# Patient Record
Sex: FEMALE | Race: WHITE | NOT HISPANIC OR LATINO | URBAN - METROPOLITAN AREA
[De-identification: names, ages, dates, MRNs, and addresses within clinical notes are randomized per-mention and may not be internally consistent; named-entity substitution may affect disease eponyms.]

---

## 2023-06-20 LAB
AMPHETAMINE (PRESENCE) IN URINE BY SCREEN METHOD: ABNORMAL
BARBITURATES PRESENCE IN URINE BY SCREEN METHOD: ABNORMAL
BENZODIAZEPINE (PRESENCE) IN URINE BY SCREEN METHOD: ABNORMAL
CANNABINOIDS IN URINE BY SCREEN METHOD: ABNORMAL
COCAINE (PRESENCE) IN URINE BY SCREEN METHOD: ABNORMAL
DRUG SCREEN COMMENT URINE: ABNORMAL
FENTANYL URINE: ABNORMAL
METHADONE (PRESENCE) IN URINE BY SCREEN METHOD: ABNORMAL
OPIATES (PRESENCE) IN URINE BY SCREEN METHOD: ABNORMAL
OXYCODONE (PRESENCE) IN URINE BY SCREEN METHOD: ABNORMAL
PHENCYCLIDINE (PRESENCE) IN URINE BY SCREEN METHOD: ABNORMAL

## 2023-06-26 LAB
AMPHETAMINES,URINE: >5000 NG/ML
MDA,URINE: <200 NG/ML
MDEA,URINE: <200 NG/ML
MDMA,UR: <200 NG/ML
METHAMPHETAMINE QUANTITATIVE URINE: <200 NG/ML
PHENTERMINE,UR: <200 NG/ML

## 2023-11-09 ENCOUNTER — TELEPHONE (OUTPATIENT)
Dept: NEUROLOGY | Facility: CLINIC | Age: 43
End: 2023-11-09
Payer: COMMERCIAL

## 2023-11-09 DIAGNOSIS — F98.8 ADD (ATTENTION DEFICIT DISORDER) WITHOUT HYPERACTIVITY: Primary | ICD-10-CM

## 2023-11-09 PROBLEM — F43.10 PTSD (POST-TRAUMATIC STRESS DISORDER): Status: ACTIVE | Noted: 2023-11-09

## 2023-11-09 PROBLEM — O99.330: Status: ACTIVE | Noted: 2023-11-09

## 2023-11-09 PROBLEM — F90.2 ATTENTION DEFICIT HYPERACTIVITY DISORDER (ADHD), COMBINED TYPE: Status: ACTIVE | Noted: 2023-11-09

## 2023-11-09 PROBLEM — F17.209 NICOTINE DEPENDENCE WITH NICOTINE-INDUCED DISORDER: Status: ACTIVE | Noted: 2023-11-09

## 2023-11-09 PROBLEM — F32.A DEPRESSION: Status: ACTIVE | Noted: 2023-11-09

## 2023-11-09 PROBLEM — G93.40 ENCEPHALOPATHY: Status: ACTIVE | Noted: 2023-11-09

## 2023-11-09 RX ORDER — BUPROPION HYDROCHLORIDE 150 MG/1
TABLET, EXTENDED RELEASE ORAL
COMMUNITY
Start: 2022-10-04

## 2023-11-09 RX ORDER — METHYLPREDNISOLONE 4 MG/1
TABLET ORAL
COMMUNITY
Start: 2023-05-09

## 2023-11-09 RX ORDER — DOXYCYCLINE 100 MG/1
100 CAPSULE ORAL 2 TIMES DAILY
COMMUNITY
Start: 2023-05-09 | End: 2023-05-16

## 2023-11-09 RX ORDER — ALBUTEROL SULFATE 90 UG/1
2 AEROSOL, METERED RESPIRATORY (INHALATION) EVERY 4 HOURS PRN
COMMUNITY
Start: 2023-05-09

## 2023-11-09 RX ORDER — LISDEXAMFETAMINE DIMESYLATE 60 MG/1
60 CAPSULE ORAL DAILY
COMMUNITY
End: 2023-11-09

## 2023-11-09 RX ORDER — DEXTROAMPHETAMINE SACCHARATE, AMPHETAMINE ASPARTATE, DEXTROAMPHETAMINE SULFATE AND AMPHETAMINE SULFATE 2.5; 2.5; 2.5; 2.5 MG/1; MG/1; MG/1; MG/1
1 TABLET ORAL 2 TIMES DAILY
COMMUNITY
Start: 2023-02-02 | End: 2023-11-09

## 2023-11-09 RX ORDER — METHYLPHENIDATE HYDROCHLORIDE 20 MG/1
20 CAPSULE, EXTENDED RELEASE ORAL EVERY MORNING
Qty: 30 CAPSULE | Refills: 0 | Status: SHIPPED | OUTPATIENT
Start: 2023-11-09 | End: 2023-12-11

## 2023-11-09 RX ORDER — DEXTROAMPHETAMINE SACCHARATE, AMPHETAMINE ASPARTATE MONOHYDRATE, DEXTROAMPHETAMINE SULFATE AND AMPHETAMINE SULFATE 5; 5; 5; 5 MG/1; MG/1; MG/1; MG/1
CAPSULE, EXTENDED RELEASE ORAL
COMMUNITY
End: 2023-11-09

## 2023-11-09 RX ORDER — LISDEXAMFETAMINE DIMESYLATE 50 MG/1
50 CAPSULE ORAL EVERY MORNING
COMMUNITY
Start: 2023-03-06 | End: 2023-11-09

## 2023-11-09 NOTE — TELEPHONE ENCOUNTER
Pt called and does not want to take the vyvanse 60mg she said its giving her massive body aches, what would you like to do?

## 2023-11-10 ENCOUNTER — TELEPHONE (OUTPATIENT)
Dept: NEUROLOGY | Facility: CLINIC | Age: 43
End: 2023-11-10
Payer: COMMERCIAL

## 2023-11-10 DIAGNOSIS — F98.8 ADD (ATTENTION DEFICIT DISORDER) WITHOUT HYPERACTIVITY: Primary | ICD-10-CM

## 2023-11-10 NOTE — TELEPHONE ENCOUNTER
Pharmacy said they dont have the methylphenidate cd 20mg but they have the CD 30mg if you can send her those they can fill it.

## 2023-11-15 RX ORDER — METHYLPHENIDATE HYDROCHLORIDE 30 MG/1
30 CAPSULE, EXTENDED RELEASE ORAL EVERY MORNING
Qty: 30 CAPSULE | Refills: 0 | Status: SHIPPED | OUTPATIENT
Start: 2023-11-15 | End: 2023-12-11

## 2023-12-11 ENCOUNTER — OFFICE VISIT (OUTPATIENT)
Dept: NEUROLOGY | Facility: CLINIC | Age: 43
End: 2023-12-11
Payer: COMMERCIAL

## 2023-12-11 VITALS
HEART RATE: 108 BPM | WEIGHT: 136.6 LBS | SYSTOLIC BLOOD PRESSURE: 110 MMHG | DIASTOLIC BLOOD PRESSURE: 62 MMHG | HEIGHT: 63 IN | BODY MASS INDEX: 24.2 KG/M2

## 2023-12-11 DIAGNOSIS — G93.40 ENCEPHALOPATHY: Primary | ICD-10-CM

## 2023-12-11 DIAGNOSIS — F43.10 PTSD (POST-TRAUMATIC STRESS DISORDER): ICD-10-CM

## 2023-12-11 DIAGNOSIS — F32.A DEPRESSION, UNSPECIFIED DEPRESSION TYPE: ICD-10-CM

## 2023-12-11 DIAGNOSIS — F90.0 ATTENTION DEFICIT HYPERACTIVITY DISORDER (ADHD), PREDOMINANTLY INATTENTIVE TYPE: ICD-10-CM

## 2023-12-11 PROCEDURE — 1036F TOBACCO NON-USER: CPT | Performed by: PSYCHIATRY & NEUROLOGY

## 2023-12-11 PROCEDURE — 99214 OFFICE O/P EST MOD 30 MIN: CPT | Performed by: PSYCHIATRY & NEUROLOGY

## 2023-12-11 RX ORDER — LISDEXAMFETAMINE DIMESYLATE 60 MG/1
60 CAPSULE ORAL EVERY MORNING
Qty: 30 CAPSULE | Refills: 0 | Status: SHIPPED | OUTPATIENT
Start: 2023-12-11 | End: 2024-02-08 | Stop reason: SDUPTHER

## 2023-12-11 RX ORDER — LISDEXAMFETAMINE DIMESYLATE 60 MG/1
60 CAPSULE ORAL EVERY MORNING
Qty: 30 CAPSULE | Refills: 0 | Status: SHIPPED | OUTPATIENT
Start: 2024-02-09 | End: 2024-03-06 | Stop reason: SDUPTHER

## 2023-12-11 RX ORDER — LISDEXAMFETAMINE DIMESYLATE 60 MG/1
60 CAPSULE ORAL EVERY MORNING
Qty: 30 CAPSULE | Refills: 0 | Status: SHIPPED | OUTPATIENT
Start: 2024-01-10 | End: 2024-03-06 | Stop reason: SDUPTHER

## 2023-12-11 ASSESSMENT — ENCOUNTER SYMPTOMS
ABDOMINAL PAIN: 0
HALLUCINATIONS: 0
FREQUENCY: 0
TREMORS: 0
SHORTNESS OF BREATH: 0
SPEECH DIFFICULTY: 0
UNEXPECTED WEIGHT CHANGE: 0
NUMBNESS: 0
FACIAL ASYMMETRY: 0
DIFFICULTY URINATING: 0
LIGHT-HEADEDNESS: 0
DIZZINESS: 0
SINUS PRESSURE: 0
SEIZURES: 0
SLEEP DISTURBANCE: 0
FEVER: 0
HEADACHES: 0
WEAKNESS: 0
TROUBLE SWALLOWING: 0
NECK PAIN: 0
PHOTOPHOBIA: 0
BRUISES/BLEEDS EASILY: 0
COUGH: 0
BACK PAIN: 0
CONFUSION: 0
WHEEZING: 0
AGITATION: 0
EYE PAIN: 0
VOMITING: 0
HYPERACTIVE: 0
FATIGUE: 0
JOINT SWELLING: 0
NECK STIFFNESS: 0
ADENOPATHY: 0
ARTHRALGIAS: 0
NAUSEA: 0
PALPITATIONS: 0

## 2023-12-11 ASSESSMENT — PATIENT HEALTH QUESTIONNAIRE - PHQ9
SUM OF ALL RESPONSES TO PHQ9 QUESTIONS 1 & 2: 0
2. FEELING DOWN, DEPRESSED OR HOPELESS: NOT AT ALL
1. LITTLE INTEREST OR PLEASURE IN DOING THINGS: NOT AT ALL

## 2023-12-11 NOTE — PROGRESS NOTES
Leila Moreno  43 y.o.       SUBJECTIVE    ADHD  Pertinent negatives include no abdominal pain, arthralgias, chest pain, coughing, fatigue, fever, headaches, joint swelling, nausea, neck pain, numbness, rash, vomiting or weakness.      Leila Moreno is a 43-year-old young lady who was seen today for follow-up of her encephalopathy due to attention deficit disorder with difficulty working number since last seen she did not like the way she felt on methylphenidate CD and Concerta and she did well on Adderall and Vyvanse in the past.  Today her physical and neurological examination was normal.  I would like to switch her medicine back to Vyvanse 60 mg daily and have discussed the controlled substance policy, abuse potential, risk benefit and the precautions to be taken and depending on how she does admit make future recommendation when she comes back to see me in 3 months    I did review the medication list.      Due to technical limitations of voice recognition and human error, this note may not accurately reflect the care of the patient.    Review of Systems   Constitutional:  Negative for fatigue, fever and unexpected weight change.   HENT:  Negative for dental problem, ear pain, hearing loss, sinus pressure, tinnitus and trouble swallowing.    Eyes:  Negative for photophobia, pain and visual disturbance.   Respiratory:  Negative for cough, shortness of breath and wheezing.    Cardiovascular:  Negative for chest pain, palpitations and leg swelling.   Gastrointestinal:  Negative for abdominal pain, nausea and vomiting.   Genitourinary:  Negative for difficulty urinating, enuresis and frequency.   Musculoskeletal:  Negative for arthralgias, back pain, joint swelling, neck pain and neck stiffness.   Skin:  Negative for pallor and rash.   Allergic/Immunologic: Negative for food allergies.   Neurological:  Negative for dizziness, tremors, seizures, syncope, facial asymmetry, speech difficulty, weakness,  "light-headedness, numbness and headaches.   Hematological:  Negative for adenopathy. Does not bruise/bleed easily.   Psychiatric/Behavioral:  Negative for agitation, behavioral problems, confusion, hallucinations and sleep disturbance. The patient is not hyperactive.         Patient Active Problem List   Diagnosis    PTSD (post-traumatic stress disorder)    Attention deficit hyperactivity disorder (ADHD), combined type    Depression    Encephalopathy    Nicotine dependence with nicotine-induced disorder    Tobacco use in pregnancy     Past Medical History:   Diagnosis Date    Personal history of other diseases of the respiratory system     Personal history of asthma     Past Surgical History:   Procedure Laterality Date    MOUTH SURGERY  09/30/2014    Oral Surgery Tooth Extraction       reports that she has never smoked. She has never used smokeless tobacco.    /62 (BP Location: Left arm, Patient Position: Sitting)   Pulse 108   Ht 1.6 m (5' 3\")   Wt 62 kg (136 lb 9.6 oz)   BMI 24.20 kg/m²     OBJECTIVE  Physical Exam/Neurological Exam   Constitutional: General appearance: no acute distress   Auscultation of Heart: Regular rate and rhythm, no murmurs, normal S1 and S2.   Carotid Arteries: Intact without any bruits.   Neck is supple.   No lymph adenopathy.   Peripheral Vascular Exam: Pulses +2 and equal in all extremities. No swelling, varicosities, edema or tenderness to palpations.    Abdomen is soft, nondistended. No organomegaly.  Mental status: The patient was in no distress, alert, interactive and cooperative. Affect is appropriate.   Orientation: oriented to person, oriented to place and oriented to time.   Memory: recent memory intact and remote memory intact.   Attention: normal attention span and normal concentrating ability.   Language: normal comprehension and no difficulty naming common objects.   Fund of knowledge: Patient displays adequate knowledge of current events, adequate fund of " knowledge regarding past history and adequate fund of knowledge regarding vocabulary.   Eyes: The ophthalmoscopic examination was normal. The fundi are visualized with normal disc margins and without.  Cranial nerve II: Visual fields full to confrontation.   Cranial nerves III, IV, and VI: Pupils round, equally reactive to light; no ptosis. EOMs intact. No nystagmus.   Cranial Nerve V: Facial sensation intact bilaterally.   Cranial nerve VII: Normal and symmetric facial strength.   Cranial nerve VIII: Hearing is intact bilaterally to finger rub / whisper.   Cranial nerves IX and X: Palate elevates symmetrically.   Cranial nerve XI: Shoulder shrug and neck rotation strength are intact.   Cranial nerve XII: Tongue midline with normal strength.   Motor: Motor exam was normal. Muscle bulk was normal in both upper and lower extremities. Muscle tone was normal in both upper and lower extremities. Muscle strength was 5/5 throughout. no abnormal or adventitious movements were present.   Deep Tendon Reflexes: left biceps 2+ , right biceps 2+, left triceps 2+, right triceps 2+, left brachioradialis 2+, right brachioradialis 2+, left patella 2+, right patella 2+, left ankle jerk 2+, right ankle jerk 2+   Plantar Reflex: Toes downgoing to plantar stimulation on the left. Toes downgoing to plantar stimulation on the right.   Sensory Exam: Normal to light touch.   Coordination: There is no limb dystaxia and rapid alternating movements are intact.  Gait: Gait is normal without spasticity, ataxia or bradykinesia. Stance is stable with a negative Romberg.    ASSESSMENT/PLAN  Diagnoses and all orders for this visit:  Encephalopathy  Attention deficit hyperactivity disorder (ADHD), predominantly inattentive type  -     lisdexamfetamine (Vyvanse) 60 mg capsule; Take 1 capsule (60 mg) by mouth once daily in the morning.  -     lisdexamfetamine (Vyvanse) 60 mg capsule; Take 1 capsule (60 mg) by mouth once daily in the morning. Do not  start before January 10, 2024.  -     lisdexamfetamine (Vyvanse) 60 mg capsule; Take 1 capsule (60 mg) by mouth once daily in the morning. Do not start before February 9, 2024.  PTSD (post-traumatic stress disorder)  Depression, unspecified depression type        Denzel Martino MD  12/11/2023  2:16 PM

## 2024-02-08 ENCOUNTER — TELEPHONE (OUTPATIENT)
Dept: NEUROLOGY | Facility: CLINIC | Age: 44
End: 2024-02-08
Payer: COMMERCIAL

## 2024-02-08 DIAGNOSIS — F90.0 ATTENTION DEFICIT HYPERACTIVITY DISORDER (ADHD), PREDOMINANTLY INATTENTIVE TYPE: ICD-10-CM

## 2024-02-08 RX ORDER — LISDEXAMFETAMINE DIMESYLATE 60 MG/1
60 CAPSULE ORAL EVERY MORNING
Qty: 30 CAPSULE | Refills: 0 | Status: SHIPPED | OUTPATIENT
Start: 2024-02-08 | End: 2024-03-06 | Stop reason: SDUPTHER

## 2024-02-08 NOTE — TELEPHONE ENCOUNTER
Pt called, her regular pharmacy is out of vyvanse[facility]  [facility]  Can you please send name brand vyvanse 60 mg to [facility] in [place].  Thanks.

## 2024-03-04 ENCOUNTER — APPOINTMENT (OUTPATIENT)
Dept: NEUROLOGY | Facility: CLINIC | Age: 44
End: 2024-03-04
Payer: COMMERCIAL

## 2024-03-04 NOTE — PROGRESS NOTES
Subjective   Leila Moreno is a 43 y.o.   female.  HPI  The patient is being seen today for their encephalopathy r/t their ADHD. They are currently taking Vyvanse 60mg and it has been effective. The patient can tell when the medication wears off, usually around 4pm. The patient agrees that their quality of life has improved while taking this medication. Patient denies any chest pain, heart palpitations, sleep issues, appetite changes, weight loss, and mood changes while taking this medication. Neurological exam is normal. I have reviewed the medications and the chart. Review of systems are negative unless otherwise specified in HPI.    Objective   Neurological Exam  Mental Status  Awake, alert and oriented to person, place and time. Speech is normal. Language is fluent with no aphasia. Attention and concentration are normal.    Cranial Nerves  CN III, IV, VI: Pupils equal round and reactive to light bilaterally.  And EOM's Normal.    Motor   Strength is 5/5 throughout all four extremities.    Sensory  Light touch is normal in upper and lower extremities.     Reflexes  Deep tendon reflexes are 2+ and symmetric in all four extremities.    Gait  Casual gait is normal including stance, stride, and arm swing.    Physical Exam  Eyes:      Pupils: Pupils are equal, round, and reactive to light.   Cardiovascular:      Rate and Rhythm: Normal rate.   Neurological:      Motor: Motor strength is normal.     Deep Tendon Reflexes: Reflexes are normal and symmetric.   Psychiatric:         Speech: Speech normal.         Assessment/Plan   Discussed following up in 3 months. Medication was sent to pharmacy. Discussed with the patient the purpose of medication, as well as potential side effects to be aware of. Informed the patient about abuse potential of medication and the importance adhering to the controlled substance agreement. Advised patient to call office with any adverse reaction to medication.

## 2024-03-06 ENCOUNTER — OFFICE VISIT (OUTPATIENT)
Dept: NEUROLOGY | Facility: CLINIC | Age: 44
End: 2024-03-06
Payer: COMMERCIAL

## 2024-03-06 VITALS
DIASTOLIC BLOOD PRESSURE: 75 MMHG | SYSTOLIC BLOOD PRESSURE: 119 MMHG | WEIGHT: 135.2 LBS | BODY MASS INDEX: 23.96 KG/M2 | HEIGHT: 63 IN | HEART RATE: 88 BPM

## 2024-03-06 DIAGNOSIS — G93.40 ENCEPHALOPATHY: Primary | ICD-10-CM

## 2024-03-06 DIAGNOSIS — F90.0 ATTENTION DEFICIT HYPERACTIVITY DISORDER (ADHD), PREDOMINANTLY INATTENTIVE TYPE: ICD-10-CM

## 2024-03-06 PROCEDURE — 99214 OFFICE O/P EST MOD 30 MIN: CPT

## 2024-03-06 PROCEDURE — 1036F TOBACCO NON-USER: CPT

## 2024-03-06 RX ORDER — METHYLPHENIDATE HYDROCHLORIDE 30 MG/1
CAPSULE, EXTENDED RELEASE ORAL
COMMUNITY
Start: 2023-11-15 | End: 2024-03-06 | Stop reason: ALTCHOICE

## 2024-03-06 RX ORDER — DEXTROAMPHETAMINE SACCHARATE, AMPHETAMINE ASPARTATE, DEXTROAMPHETAMINE SULFATE AND AMPHETAMINE SULFATE 2.5; 2.5; 2.5; 2.5 MG/1; MG/1; MG/1; MG/1
TABLET ORAL
COMMUNITY
Start: 2022-05-12 | End: 2024-03-06 | Stop reason: ALTCHOICE

## 2024-03-06 RX ORDER — LISDEXAMFETAMINE DIMESYLATE 60 MG/1
60 CAPSULE ORAL EVERY MORNING
Qty: 30 CAPSULE | Refills: 0 | Status: SHIPPED | OUTPATIENT
Start: 2024-04-05 | End: 2024-05-07 | Stop reason: SDUPTHER

## 2024-03-06 RX ORDER — LISDEXAMFETAMINE DIMESYLATE 60 MG/1
60 CAPSULE ORAL EVERY MORNING
Qty: 30 CAPSULE | Refills: 0 | Status: SHIPPED | OUTPATIENT
Start: 2024-03-06 | End: 2024-05-29 | Stop reason: SDUPTHER

## 2024-03-06 RX ORDER — AMOXICILLIN 875 MG/1
1 TABLET, FILM COATED ORAL EVERY 12 HOURS
COMMUNITY
Start: 2024-01-05

## 2024-03-06 RX ORDER — LISDEXAMFETAMINE DIMESYLATE 60 MG/1
60 CAPSULE ORAL EVERY MORNING
Qty: 30 CAPSULE | Refills: 0 | Status: SHIPPED | OUTPATIENT
Start: 2024-05-05 | End: 2024-03-08 | Stop reason: SDUPTHER

## 2024-03-06 RX ORDER — ONDANSETRON 4 MG/1
TABLET, ORALLY DISINTEGRATING ORAL
COMMUNITY
Start: 2024-01-14

## 2024-03-06 RX ORDER — FLUTICASONE PROPIONATE 50 MCG
SPRAY, SUSPENSION (ML) NASAL
COMMUNITY
Start: 2024-01-05

## 2024-03-08 ENCOUNTER — TELEPHONE (OUTPATIENT)
Dept: NEUROLOGY | Facility: CLINIC | Age: 44
End: 2024-03-08
Payer: COMMERCIAL

## 2024-03-08 DIAGNOSIS — F90.0 ATTENTION DEFICIT HYPERACTIVITY DISORDER (ADHD), PREDOMINANTLY INATTENTIVE TYPE: Primary | ICD-10-CM

## 2024-03-08 RX ORDER — LISDEXAMFETAMINE DIMESYLATE 60 MG/1
60 CAPSULE ORAL EVERY MORNING
Qty: 30 CAPSULE | Refills: 0 | Status: SHIPPED | OUTPATIENT
Start: 2024-05-05 | End: 2024-05-07 | Stop reason: SDUPTHER

## 2024-03-08 NOTE — TELEPHONE ENCOUNTER
Pt's regular pharmacy is out of her meds.  She found it at [facility] in [place].  Can you please send over her generic vyvanse 60mg?  Thanks.

## 2024-05-07 ENCOUNTER — TELEPHONE (OUTPATIENT)
Dept: NEUROLOGY | Facility: CLINIC | Age: 44
End: 2024-05-07
Payer: COMMERCIAL

## 2024-05-07 DIAGNOSIS — F90.0 ATTENTION DEFICIT HYPERACTIVITY DISORDER (ADHD), PREDOMINANTLY INATTENTIVE TYPE: ICD-10-CM

## 2024-05-07 RX ORDER — LISDEXAMFETAMINE DIMESYLATE 60 MG/1
60 CAPSULE ORAL EVERY MORNING
Qty: 30 CAPSULE | Refills: 0 | Status: SHIPPED | OUTPATIENT
Start: 2024-05-07 | End: 2024-05-29 | Stop reason: SDUPTHER

## 2024-05-07 RX ORDER — LISDEXAMFETAMINE DIMESYLATE 60 MG/1
60 CAPSULE ORAL EVERY MORNING
Qty: 30 CAPSULE | Refills: 0 | Status: SHIPPED | OUTPATIENT
Start: 2024-06-06 | End: 2024-05-29 | Stop reason: SDUPTHER

## 2024-05-07 NOTE — TELEPHONE ENCOUNTER
Pt needs rx for name brand vyvanse 60 mg sent to [facility] at [address] please.  I called pharmacy about 5/5 rx and they said they don't have it on file, but do have it in stock.  Thanks.

## 2024-05-21 NOTE — PROGRESS NOTES
Subjective   Leila Moreno is a 43 y.o.   female.  HPI  The patient is being seen today for their encephalopathy r/t their ADHD. They are currently taking Vyvanse 60mg and it has been somewhat effective. The patient can tell when the medication wears off, usually 3pm. The patient agrees that their quality of life has improved while taking this medication. Patient denies any chest pain, heart palpitations, sleep issues, appetite changes, weight loss, and mood changes while taking this medication. Neurological exam is normal. I have reviewed the medications and the chart. Review of systems are negative unless otherwise specified in HPI.    I have personally reviewed the OARRS report for this patient. I have considered the risks of abuse, dependence, addiction, and diversion. I believe that it is critically appropriate for this patient to be prescribed this medication based on documented diagnosis of ADHD. An updated contract between the patient and myself was signed, scanned into the EMR, and the patient agrees to the terms. Any deviation from this agreement will result in termination from my practice. CSA was signed and dated. UDS obtained, results are pending.   Objective   Neurological Exam  Mental Status  Awake, alert and oriented to person, place and time. Speech is normal. Language is fluent with no aphasia. Attention and concentration are normal.    Cranial Nerves  CN III, IV, VI: Pupils equal round and reactive to light bilaterally.  And EOM's Normal.    Motor   Strength is 5/5 throughout all four extremities.    Sensory  Light touch is normal in upper and lower extremities.     Reflexes  Deep tendon reflexes are 2+ and symmetric in all four extremities.    Gait  Casual gait is normal including stance, stride, and arm swing.Normal toe walking. Normal heel walking.    Physical Exam  Eyes:      Pupils: Pupils are equal, round, and reactive to light.   Neurological:      Motor: Motor strength is normal.      Deep Tendon Reflexes: Reflexes are normal and symmetric.   Psychiatric:         Speech: Speech normal.           Assessment/Plan   Will add short acting Adderall for the afternoon.  Discussed following up in 3 months. Medication was sent to pharmacy. Discussed with the patient the purpose of medication, as well as potential side effects to be aware of. Informed the patient about abuse potential of medication and the importance adhering to the controlled substance agreement. Advised patient to call office with any adverse reaction to medication.

## 2024-05-29 ENCOUNTER — OFFICE VISIT (OUTPATIENT)
Dept: NEUROLOGY | Facility: CLINIC | Age: 44
End: 2024-05-29
Payer: COMMERCIAL

## 2024-05-29 VITALS
HEIGHT: 63 IN | HEART RATE: 97 BPM | BODY MASS INDEX: 23.39 KG/M2 | DIASTOLIC BLOOD PRESSURE: 62 MMHG | SYSTOLIC BLOOD PRESSURE: 93 MMHG | WEIGHT: 132 LBS

## 2024-05-29 DIAGNOSIS — F90.0 ATTENTION DEFICIT HYPERACTIVITY DISORDER (ADHD), PREDOMINANTLY INATTENTIVE TYPE: ICD-10-CM

## 2024-05-29 DIAGNOSIS — G93.40 ENCEPHALOPATHY: Primary | ICD-10-CM

## 2024-05-29 DIAGNOSIS — F90.2 ATTENTION DEFICIT HYPERACTIVITY DISORDER (ADHD), COMBINED TYPE: ICD-10-CM

## 2024-05-29 LAB
AMPHETAMINES UR QL SCN: ABNORMAL
BARBITURATES UR QL SCN: ABNORMAL
BENZODIAZ UR QL SCN: ABNORMAL
BZE UR QL SCN: ABNORMAL
CANNABINOIDS UR QL SCN: ABNORMAL
FENTANYL+NORFENTANYL UR QL SCN: ABNORMAL
METHADONE UR QL SCN: ABNORMAL
OPIATES UR QL SCN: ABNORMAL
OXYCODONE+OXYMORPHONE UR QL SCN: ABNORMAL
PCP UR QL SCN: ABNORMAL

## 2024-05-29 PROCEDURE — 99214 OFFICE O/P EST MOD 30 MIN: CPT

## 2024-05-29 PROCEDURE — 80324 DRUG SCREEN AMPHETAMINES 1/2: CPT

## 2024-05-29 PROCEDURE — 4004F PT TOBACCO SCREEN RCVD TLK: CPT

## 2024-05-29 PROCEDURE — 80307 DRUG TEST PRSMV CHEM ANLYZR: CPT

## 2024-05-29 RX ORDER — LISDEXAMFETAMINE DIMESYLATE 60 MG/1
60 CAPSULE ORAL EVERY MORNING
Qty: 30 CAPSULE | Refills: 0 | Status: SHIPPED | OUTPATIENT
Start: 2024-07-28 | End: 2024-08-27

## 2024-05-29 RX ORDER — DEXTROAMPHETAMINE SACCHARATE, AMPHETAMINE ASPARTATE, DEXTROAMPHETAMINE SULFATE AND AMPHETAMINE SULFATE 2.5; 2.5; 2.5; 2.5 MG/1; MG/1; MG/1; MG/1
10 TABLET ORAL DAILY
Qty: 30 TABLET | Refills: 0 | Status: SHIPPED | OUTPATIENT
Start: 2024-05-29 | End: 2024-06-28

## 2024-05-29 RX ORDER — LISDEXAMFETAMINE DIMESYLATE 60 MG/1
60 CAPSULE ORAL EVERY MORNING
Qty: 30 CAPSULE | Refills: 0 | Status: SHIPPED | OUTPATIENT
Start: 2024-06-28 | End: 2024-07-28

## 2024-05-29 RX ORDER — LISDEXAMFETAMINE DIMESYLATE 60 MG/1
60 CAPSULE ORAL EVERY MORNING
Qty: 30 CAPSULE | Refills: 0 | Status: SHIPPED | OUTPATIENT
Start: 2024-05-29 | End: 2024-06-28

## 2024-05-29 RX ORDER — DEXTROAMPHETAMINE SACCHARATE, AMPHETAMINE ASPARTATE, DEXTROAMPHETAMINE SULFATE AND AMPHETAMINE SULFATE 2.5; 2.5; 2.5; 2.5 MG/1; MG/1; MG/1; MG/1
10 TABLET ORAL DAILY
Qty: 30 TABLET | Refills: 0 | Status: SHIPPED | OUTPATIENT
Start: 2024-06-28 | End: 2024-07-28

## 2024-05-29 RX ORDER — DEXTROAMPHETAMINE SACCHARATE, AMPHETAMINE ASPARTATE, DEXTROAMPHETAMINE SULFATE AND AMPHETAMINE SULFATE 2.5; 2.5; 2.5; 2.5 MG/1; MG/1; MG/1; MG/1
10 TABLET ORAL DAILY
Qty: 30 TABLET | Refills: 0 | Status: SHIPPED | OUTPATIENT
Start: 2024-07-28 | End: 2024-08-27

## 2024-05-29 ASSESSMENT — PATIENT HEALTH QUESTIONNAIRE - PHQ9
2. FEELING DOWN, DEPRESSED OR HOPELESS: NOT AT ALL
SUM OF ALL RESPONSES TO PHQ9 QUESTIONS 1 & 2: 0
1. LITTLE INTEREST OR PLEASURE IN DOING THINGS: NOT AT ALL

## 2024-06-04 LAB
AMPHET UR-MCNC: >5000 NG/ML
MDA UR-MCNC: <200 NG/ML
MDEA UR-MCNC: <200 NG/ML
MDMA UR-MCNC: <200 NG/ML
METHAMPHET UR-MCNC: <200 NG/ML
PHENTERMINE UR CFM-MCNC: <200 NG/ML

## 2024-08-21 ENCOUNTER — APPOINTMENT (OUTPATIENT)
Dept: NEUROLOGY | Facility: CLINIC | Age: 44
End: 2024-08-21
Payer: COMMERCIAL

## 2024-08-21 VITALS
SYSTOLIC BLOOD PRESSURE: 126 MMHG | BODY MASS INDEX: 23.46 KG/M2 | DIASTOLIC BLOOD PRESSURE: 78 MMHG | HEIGHT: 63 IN | HEART RATE: 105 BPM | WEIGHT: 132.4 LBS

## 2024-08-21 DIAGNOSIS — G93.40 ENCEPHALOPATHY: Primary | ICD-10-CM

## 2024-08-21 DIAGNOSIS — F90.2 ATTENTION DEFICIT HYPERACTIVITY DISORDER (ADHD), COMBINED TYPE: ICD-10-CM

## 2024-08-21 PROCEDURE — 3008F BODY MASS INDEX DOCD: CPT

## 2024-08-21 PROCEDURE — 4004F PT TOBACCO SCREEN RCVD TLK: CPT

## 2024-08-21 PROCEDURE — 99214 OFFICE O/P EST MOD 30 MIN: CPT

## 2024-08-21 RX ORDER — DEXTROAMPHETAMINE SACCHARATE, AMPHETAMINE ASPARTATE, DEXTROAMPHETAMINE SULFATE AND AMPHETAMINE SULFATE 2.5; 2.5; 2.5; 2.5 MG/1; MG/1; MG/1; MG/1
10 TABLET ORAL DAILY
Qty: 30 TABLET | Refills: 0 | Status: SHIPPED | OUTPATIENT
Start: 2024-09-20 | End: 2024-10-20

## 2024-08-21 RX ORDER — DEXTROAMPHETAMINE SACCHARATE, AMPHETAMINE ASPARTATE MONOHYDRATE, DEXTROAMPHETAMINE SULFATE AND AMPHETAMINE SULFATE 5; 5; 5; 5 MG/1; MG/1; MG/1; MG/1
20 CAPSULE, EXTENDED RELEASE ORAL EVERY MORNING
Qty: 30 CAPSULE | Refills: 0 | Status: SHIPPED | OUTPATIENT
Start: 2024-08-21 | End: 2024-09-20

## 2024-08-21 RX ORDER — DEXTROAMPHETAMINE SACCHARATE, AMPHETAMINE ASPARTATE, DEXTROAMPHETAMINE SULFATE AND AMPHETAMINE SULFATE 2.5; 2.5; 2.5; 2.5 MG/1; MG/1; MG/1; MG/1
10 TABLET ORAL DAILY
Qty: 30 TABLET | Refills: 0 | Status: SHIPPED | OUTPATIENT
Start: 2024-10-20 | End: 2024-11-19

## 2024-08-21 RX ORDER — DEXTROAMPHETAMINE SACCHARATE, AMPHETAMINE ASPARTATE, DEXTROAMPHETAMINE SULFATE AND AMPHETAMINE SULFATE 2.5; 2.5; 2.5; 2.5 MG/1; MG/1; MG/1; MG/1
10 TABLET ORAL DAILY
Qty: 30 TABLET | Refills: 0 | Status: SHIPPED | OUTPATIENT
Start: 2024-08-21 | End: 2024-09-20

## 2024-08-21 ASSESSMENT — PATIENT HEALTH QUESTIONNAIRE - PHQ9
1. LITTLE INTEREST OR PLEASURE IN DOING THINGS: NOT AT ALL
2. FEELING DOWN, DEPRESSED OR HOPELESS: NOT AT ALL
SUM OF ALL RESPONSES TO PHQ9 QUESTIONS 1 & 2: 0

## 2024-08-21 NOTE — PROGRESS NOTES
Subjective   Leila Moreno is a 44 y.o.   female.  HPI  The patient is being seen today for their encephalopathy r/t their ADHD. They are currently taking Vyvanse 60mg and short acting at about 4pm and it has been somewhat effective. The patient can tell when the medication wears off, but she feels as though the Adderall XR was better for her. The patient agrees that their quality of life has improved while taking this medication. Patient denies any chest pain, heart palpitations, sleep issues, appetite changes, weight loss, and mood changes while taking this medication. Neurological exam is normal. I have reviewed the medications and the chart. Review of systems are negative unless otherwise specified in HPI.    Objective   Neurological Exam  Mental Status  Awake, alert and oriented to person, place and time. Speech is normal. Language is fluent with no aphasia. Attention and concentration are normal.    Cranial Nerves  CN III, IV, VI: Pupils equal round and reactive to light bilaterally.  And EOM's Normal.    Motor   Strength is 5/5 throughout all four extremities.    Sensory  Light touch is normal in upper and lower extremities.     Reflexes  Deep tendon reflexes are 2+ and symmetric in all four extremities.    Gait  Casual gait is normal including stance, stride, and arm swing.Normal toe walking. Normal heel walking.    Physical Exam  Eyes:      Pupils: Pupils are equal, round, and reactive to light.   Neurological:      Motor: Motor strength is normal.     Deep Tendon Reflexes: Reflexes are normal and symmetric.   Psychiatric:         Speech: Speech normal.         Assessment/Plan   Will try Adderall 20mg XR and see how she does. She will call to let me know if the medicine is available at her pharmacy.  Discussed following up in 3 months. Medication was sent to pharmacy. Discussed with the patient the purpose of medication, as well as potential side effects to be aware of. Informed the patient about abuse  potential of medication and the importance adhering to the controlled substance agreement. Advised patient to call office with any adverse reaction to medication.

## 2024-09-16 ENCOUNTER — TELEPHONE (OUTPATIENT)
Dept: NEUROLOGY | Facility: CLINIC | Age: 44
End: 2024-09-16
Payer: COMMERCIAL

## 2024-09-16 DIAGNOSIS — F90.2 ATTENTION DEFICIT HYPERACTIVITY DISORDER (ADHD), COMBINED TYPE: Primary | ICD-10-CM

## 2024-09-16 RX ORDER — DEXTROAMPHETAMINE SACCHARATE, AMPHETAMINE ASPARTATE MONOHYDRATE, DEXTROAMPHETAMINE SULFATE AND AMPHETAMINE SULFATE 5; 5; 5; 5 MG/1; MG/1; MG/1; MG/1
20 CAPSULE, EXTENDED RELEASE ORAL EVERY MORNING
Qty: 30 CAPSULE | Refills: 0 | Status: SHIPPED | OUTPATIENT
Start: 2024-10-15 | End: 2024-11-14

## 2024-09-16 RX ORDER — DEXTROAMPHETAMINE SACCHARATE, AMPHETAMINE ASPARTATE MONOHYDRATE, DEXTROAMPHETAMINE SULFATE AND AMPHETAMINE SULFATE 5; 5; 5; 5 MG/1; MG/1; MG/1; MG/1
20 CAPSULE, EXTENDED RELEASE ORAL EVERY MORNING
Qty: 30 CAPSULE | Refills: 0 | Status: SHIPPED | OUTPATIENT
Start: 2024-09-16 | End: 2024-10-16

## 2024-09-19 ENCOUNTER — TELEPHONE (OUTPATIENT)
Dept: NEUROLOGY | Facility: CLINIC | Age: 44
End: 2024-09-19
Payer: COMMERCIAL

## 2024-09-19 DIAGNOSIS — F90.0 ATTENTION DEFICIT HYPERACTIVITY DISORDER (ADHD), PREDOMINANTLY INATTENTIVE TYPE: ICD-10-CM

## 2024-09-19 RX ORDER — LISDEXAMFETAMINE DIMESYLATE 60 MG/1
60 CAPSULE ORAL EVERY MORNING
Qty: 30 CAPSULE | Refills: 0 | Status: SHIPPED | OUTPATIENT
Start: 2024-09-19 | End: 2024-10-19

## 2024-09-19 RX ORDER — LISDEXAMFETAMINE DIMESYLATE 60 MG/1
60 CAPSULE ORAL EVERY MORNING
Qty: 30 CAPSULE | Refills: 0 | Status: SHIPPED | OUTPATIENT
Start: 2024-10-18 | End: 2024-11-17

## 2024-09-19 NOTE — TELEPHONE ENCOUNTER
Pt's pharmacy is out of Adderall XR.  She is asking if she can go back on old med.  Could you please send name brand vyvanse 60 mg to [facility] in [place] for her?  Thanks.

## 2024-11-19 NOTE — PROGRESS NOTES
Subjective   Leila Moreno is a 44 y.o.   female.  HPI  The patient is being seen today for their encephalopathy r/t their ADHD. They are currently taking Adderall XR but she has not been able to find it at a pharmacy. She has tried Vyvanse, but this has caused her some mood changes.  and it has been effective. The patient can tell when the medication wears off. The patient agrees that their quality of life has improved while taking this medication. Patient denies any chest pain, heart palpitations, sleep issues, appetite changes, weight loss, and mood changes while taking this medication. She is also having a lot of pain in LUE with her CTS. [facility] has [facility] to [facility] and is currently seeing a chiropractor. She has not been able to work in 2 weeks due to the pain. I have reviewed the medications and the chart. Review of systems are negative unless otherwise specified in HPI.    Objective   Neurological Exam  Mental Status  Awake, alert and oriented to person, place and time. Speech is normal. Language is fluent with no aphasia. Attention and concentration are normal.    Cranial Nerves  CN III, IV, VI: Pupils equal round and reactive to light bilaterally.  And EOM's Normal.    Motor   Strength is 5/5 throughout all four extremities.    Sensory  Light touch is normal in upper and lower extremities.     Reflexes  Deep tendon reflexes are 2+ and symmetric in all four extremities.    Gait  Casual gait is normal including stance, stride, and arm swing.Normal toe walking. Normal heel walking.    Physical Exam  Eyes:      Pupils: Pupils are equal, round, and reactive to light.   Neurological:      Motor: Motor strength is normal.     Deep Tendon Reflexes: Reflexes are normal and symmetric.   Psychiatric:         Speech: Speech normal.         Assessment/Plan   #Encephalopathy in the setting of ADHD  Will start Adderall 10mg TID and see how she does due to in availability of long acting  Adderall.  Discussed following up in 3 months. Medication was sent to pharmacy. Discussed with the patient the purpose of medication, as well as potential side effects to be aware of. Informed the patient about abuse potential of medication and the importance adhering to the controlled substance agreement. Advised patient to call office with any adverse reaction to medication.      #CTS  Orthopedic referral given

## 2024-11-20 ENCOUNTER — APPOINTMENT (OUTPATIENT)
Dept: NEUROLOGY | Facility: CLINIC | Age: 44
End: 2024-11-20
Payer: COMMERCIAL

## 2024-11-20 DIAGNOSIS — G93.40 ENCEPHALOPATHY, UNSPECIFIED TYPE: Primary | ICD-10-CM

## 2024-11-20 DIAGNOSIS — F90.2 ATTENTION DEFICIT HYPERACTIVITY DISORDER (ADHD), COMBINED TYPE: ICD-10-CM

## 2024-11-20 DIAGNOSIS — G56.02 CARPAL TUNNEL SYNDROME OF LEFT WRIST: ICD-10-CM

## 2024-11-20 PROCEDURE — 99214 OFFICE O/P EST MOD 30 MIN: CPT

## 2024-11-20 RX ORDER — DEXTROAMPHETAMINE SACCHARATE, AMPHETAMINE ASPARTATE, DEXTROAMPHETAMINE SULFATE AND AMPHETAMINE SULFATE 2.5; 2.5; 2.5; 2.5 MG/1; MG/1; MG/1; MG/1
10 TABLET ORAL 3 TIMES DAILY
Qty: 90 TABLET | Refills: 0 | Status: SHIPPED | OUTPATIENT
Start: 2024-12-19 | End: 2025-01-18

## 2024-11-20 RX ORDER — DEXTROAMPHETAMINE SACCHARATE, AMPHETAMINE ASPARTATE, DEXTROAMPHETAMINE SULFATE AND AMPHETAMINE SULFATE 2.5; 2.5; 2.5; 2.5 MG/1; MG/1; MG/1; MG/1
10 TABLET ORAL 3 TIMES DAILY
Qty: 90 TABLET | Refills: 0 | Status: SHIPPED | OUTPATIENT
Start: 2025-01-19 | End: 2025-02-18

## 2024-11-20 RX ORDER — DEXTROAMPHETAMINE SACCHARATE, AMPHETAMINE ASPARTATE, DEXTROAMPHETAMINE SULFATE AND AMPHETAMINE SULFATE 2.5; 2.5; 2.5; 2.5 MG/1; MG/1; MG/1; MG/1
10 TABLET ORAL 3 TIMES DAILY
Qty: 90 TABLET | Refills: 0 | Status: SHIPPED | OUTPATIENT
Start: 2024-11-20 | End: 2024-12-20

## 2024-11-26 ENCOUNTER — OFFICE VISIT (OUTPATIENT)
Dept: ORTHOPEDIC SURGERY | Facility: CLINIC | Age: 44
End: 2024-11-26
Payer: COMMERCIAL

## 2024-11-26 DIAGNOSIS — G56.00 CARPAL TUNNEL SYNDROME, UNSPECIFIED LATERALITY: ICD-10-CM

## 2024-11-26 DIAGNOSIS — M54.12 CERVICAL RADICULOPATHY: ICD-10-CM

## 2024-11-26 DIAGNOSIS — G56.02 CARPAL TUNNEL SYNDROME OF LEFT WRIST: Primary | ICD-10-CM

## 2024-11-26 DIAGNOSIS — G56.20 CUBITAL TUNNEL SYNDROME, UNSPECIFIED LATERALITY: ICD-10-CM

## 2024-11-26 PROCEDURE — 99214 OFFICE O/P EST MOD 30 MIN: CPT | Performed by: ORTHOPAEDIC SURGERY

## 2024-11-26 PROCEDURE — 2500000004 HC RX 250 GENERAL PHARMACY W/ HCPCS (ALT 636 FOR OP/ED): Performed by: ORTHOPAEDIC SURGERY

## 2024-11-26 PROCEDURE — 99204 OFFICE O/P NEW MOD 45 MIN: CPT | Performed by: ORTHOPAEDIC SURGERY

## 2024-11-26 PROCEDURE — 20526 THER INJECTION CARP TUNNEL: CPT | Mod: LT | Performed by: ORTHOPAEDIC SURGERY

## 2024-11-26 RX ORDER — LIDOCAINE HYDROCHLORIDE 10 MG/ML
1 INJECTION, SOLUTION INFILTRATION; PERINEURAL
Status: COMPLETED | OUTPATIENT
Start: 2024-11-26 | End: 2024-11-26

## 2024-11-26 NOTE — PROGRESS NOTES
History present illness: Patient presents today for evaluation of symptoms compatible with peripheral nerve compression versus cervical radiculopathy.  Right-hand-dominant.  History of asthma.  Patient is a smoker.  She describes pain numbness and tingling complex in its description.  This has been ongoing since late October.  She has tried a Medrol Dosepak and bracing.  She feels the steroids did help a bit.  She describes pain radiating from the midline posterior neck about the posterior lateral shoulder arm and dorsal radial forearm.      Past medical history: The patient's past medical history, family history, social history, and review of systems were documented on the patient medical intake.  The updated data was reviewed in the electronic medical record.  History is negative except otherwise stated in history of present illness.        Physical examination:  General: Alert and oriented to person, place, and time.  No acute distress and breathing comfortably: Pleasant and cooperative with examination.  HEENT: Head is normocephalic and atraumatic.  Neck: Supple, no visible swelling.  Cardiovascular: No palpable tachycardia  Lungs: No audible wheezing or labored breathing  Abdomen: Nondistended.  Extremities: Evaluation of the left upper extremity finds the patient had palpable radial artery at the wrist with brisk capillary refill to all digits.  Patient has intact sensation to axillary radial median and ulnar nerves.  There are no open wounds.  There are no signs of infection.  There is no evidence of lymphedema or lymphatic streaking.  The patient has supple compartments to left arm forearm and hand.  Positive Tinel's over course of ulnar nerve to left elbow and median nerve to left wrist.      Radiology:      Assessment: Complex left upper extremity pain numbness and tingling concerning for cervical radiculopathy versus peripheral nerve compression versus combination      Plan: Treatment options were  discussed.  Recommendations were made for continuance of nighttime splinting and for diagnostic/therapeutic injection to the left carpal tunnel.  Follow-up after EMG.  Patient is agreeable with this strategy.  Recommend for immediate smoking cessation.        Procedure:  Hand / UE Inj/Asp: L carpal tunnel for carpal tunnel syndrome on 11/26/2024 10:06 AM  Indications: diagnostic and therapeutic  Details: 25 G needle, volar approach  Medications: 10 mg triamcinolone acetonide 10 mg/mL; 1 mL lidocaine 10 mg/mL (1 %)  Outcome: tolerated well, no immediate complications    Left Carpal Tunnel Injection: It was explained to the patient that the risks of a steroid injection include but are not limited to infection, local skin irritation, skin atrophy, calcification, continued pain and discomfort, elevated blood sugar, burning, failure to relieve pain, and possible late infection. The patient verbalized good insight and verbalized consent for the injection. It was further explained that the post-injection discomfort can be alleviated with additional medications, ice, elevation, and rest over the first 24 hours, and that these modalities are recommended.  After informed consent was provided, patient identification was confirmed, and allergies were verified, the patient was appropriately positioned. The site was marked and time-out performed.    Using aseptic technique, a solution containing 1 mL of 10 mg of Kenalog and 1 mL of 1% lidocaine without epinephrine was injected into the patient's left carpal tunnel. A 25-gauge needle was inserted just ulnar to the anatomic course of the palmaris longus tendon at the level of the distal wrist flexion crease. It was slowly advanced deep to the distal antebrachial fascia. The solution was then injected slowly, taking care to ensure that the patient experienced no paresthesias during the injection of the solution. After injection of the solution, the patient was asked to perform  active flexion and extension of the digits and wrist to help disperse the solution. A band-aid was then placed at the injection site. The patient tolerated this left carpal tunnel injection well.      Procedure, treatment alternatives, risks and benefits explained, specific risks discussed. Consent was given by the patient. Immediately prior to procedure a time out was called to verify the correct patient, procedure, equipment, support staff and site/side marked as required. Patient was prepped and draped in the usual sterile fashion.

## 2025-02-12 ENCOUNTER — APPOINTMENT (OUTPATIENT)
Dept: NEUROLOGY | Facility: CLINIC | Age: 45
End: 2025-02-12
Payer: COMMERCIAL

## 2025-02-12 VITALS
SYSTOLIC BLOOD PRESSURE: 98 MMHG | DIASTOLIC BLOOD PRESSURE: 66 MMHG | HEART RATE: 92 BPM | WEIGHT: 134.4 LBS | HEIGHT: 63 IN | BODY MASS INDEX: 23.81 KG/M2

## 2025-02-12 DIAGNOSIS — G93.40 ENCEPHALOPATHY, UNSPECIFIED TYPE: Primary | ICD-10-CM

## 2025-02-12 DIAGNOSIS — F90.2 ATTENTION DEFICIT HYPERACTIVITY DISORDER (ADHD), COMBINED TYPE: ICD-10-CM

## 2025-02-12 PROCEDURE — 99214 OFFICE O/P EST MOD 30 MIN: CPT

## 2025-02-12 PROCEDURE — 3008F BODY MASS INDEX DOCD: CPT

## 2025-02-12 RX ORDER — DEXTROAMPHETAMINE SACCHARATE, AMPHETAMINE ASPARTATE, DEXTROAMPHETAMINE SULFATE AND AMPHETAMINE SULFATE 2.5; 2.5; 2.5; 2.5 MG/1; MG/1; MG/1; MG/1
10 TABLET ORAL 3 TIMES DAILY
Qty: 90 TABLET | Refills: 0 | Status: SHIPPED | OUTPATIENT
Start: 2025-03-11 | End: 2025-04-10

## 2025-02-12 RX ORDER — CYCLOBENZAPRINE HCL 10 MG
TABLET ORAL
COMMUNITY
Start: 2024-11-06

## 2025-02-12 RX ORDER — DEXTROAMPHETAMINE SACCHARATE, AMPHETAMINE ASPARTATE, DEXTROAMPHETAMINE SULFATE AND AMPHETAMINE SULFATE 2.5; 2.5; 2.5; 2.5 MG/1; MG/1; MG/1; MG/1
10 TABLET ORAL 3 TIMES DAILY
Qty: 90 TABLET | Refills: 0 | Status: SHIPPED | OUTPATIENT
Start: 2025-02-12 | End: 2025-03-14

## 2025-02-12 RX ORDER — LIDOCAINE 50 MG/G
PATCH TOPICAL
COMMUNITY
Start: 2024-11-06

## 2025-02-12 RX ORDER — DEXTROAMPHETAMINE SACCHARATE, AMPHETAMINE ASPARTATE, DEXTROAMPHETAMINE SULFATE AND AMPHETAMINE SULFATE 2.5; 2.5; 2.5; 2.5 MG/1; MG/1; MG/1; MG/1
10 TABLET ORAL 3 TIMES DAILY
Qty: 90 TABLET | Refills: 0 | Status: SHIPPED | OUTPATIENT
Start: 2025-04-11 | End: 2025-05-11

## 2025-02-12 ASSESSMENT — PATIENT HEALTH QUESTIONNAIRE - PHQ9
SUM OF ALL RESPONSES TO PHQ9 QUESTIONS 1 & 2: 0
1. LITTLE INTEREST OR PLEASURE IN DOING THINGS: NOT AT ALL
2. FEELING DOWN, DEPRESSED OR HOPELESS: NOT AT ALL

## 2025-02-12 NOTE — PROGRESS NOTES
Subjective   Leila Moreno is a 44 y.o.   female.  HPI  The patient is being seen today for their encephalopathy r/t their ADHD. They are currently taking Adderall 10mg TID and it has been effective. The patient can tell when the medication wears off. The patient agrees that their quality of life has improved while taking this medication. Patient denies any chest pain, heart palpitations, sleep issues, appetite changes, weight loss, and mood changes while taking this medication. She is seeing Ortho for CTS, had steroid injection that helped a lot. She also had EMG done with Dr. Chavez confirming this diagnosis. I have reviewed the medications and the chart. Review of systems are negative unless otherwise specified in HPI.    Objective   Neurological Exam  Mental Status  Awake, alert and oriented to person, place and time. Speech is normal. Language is fluent with no aphasia. Attention and concentration are normal.    Cranial Nerves  CN III, IV, VI: Pupils equal round and reactive to light bilaterally.  And EOM's Normal.    Motor   Strength is 5/5 throughout all four extremities.    Sensory  Light touch is normal in upper and lower extremities.     Reflexes  Deep tendon reflexes are 2+ and symmetric in all four extremities.    Gait  Casual gait is normal including stance, stride, and arm swing.Normal toe walking. Normal heel walking.    Physical Exam  Eyes:      Pupils: Pupils are equal, round, and reactive to light.   Neurological:      Motor: Motor strength is normal.     Deep Tendon Reflexes: Reflexes are normal and symmetric.   Psychiatric:         Speech: Speech normal.         Assessment/Plan   #Encephalopathy in the setting of ADHD    Discussed following up in 3 months. Medication was sent to pharmacy. Discussed with the patient the purpose of medication, as well as potential side effects to be aware of. Informed the patient about abuse potential of medication and the importance adhering to the controlled  substance agreement. Advised patient to call office with any adverse reaction to medication.    #CTS  Follow up with orthopedics for bilateral wrist braces

## 2025-02-17 ENCOUNTER — TELEPHONE (OUTPATIENT)
Dept: NEUROLOGY | Facility: CLINIC | Age: 45
End: 2025-02-17
Payer: COMMERCIAL

## 2025-02-17 NOTE — TELEPHONE ENCOUNTER
No PA required for Adderall 10 mg via [employer]. Fax sent to [facility] at [phone].

## 2025-02-18 ENCOUNTER — TELEPHONE (OUTPATIENT)
Dept: NEUROLOGY | Facility: CLINIC | Age: 45
End: 2025-02-18
Payer: COMMERCIAL

## 2025-02-19 ENCOUNTER — TELEPHONE (OUTPATIENT)
Dept: NEUROLOGY | Facility: CLINIC | Age: 45
End: 2025-02-19
Payer: COMMERCIAL

## 2025-02-21 ENCOUNTER — TELEPHONE (OUTPATIENT)
Dept: NEUROLOGY | Facility: CLINIC | Age: 45
End: 2025-02-21
Payer: COMMERCIAL

## 2025-02-21 NOTE — TELEPHONE ENCOUNTER
Adderall 10 mg tid PA denied, appeal submitted and denied. Please review and advise. Below I listed Formulary guide listed on [facility]'s website. Pt was on Vyvanse ion the past and reports change in mood. Pt also was Adderall 20 mg and did well but was unable to get due to shortage. Pt willing to try anything at this point other than Vyvanse.   [facility] ADHD Formulary Quick Reference Guide  ADHD Agents (QL for all)  - amphetamine, Adzenys XR-ODT®*,  Dyanavel XR®*, Evekeo®*,  clonidine (ST), atomoxetine  amphetamine/dextroamphetamine*,  Jornay PM®, dexmethylphenidate*,  Vyvanse®*, dextroamphetamine  (solution, tabs, patch), QuilliChew  ER®*, Quillivant XR®*, Qelbree®,  methylphenidate (chew tabs,  solution, tabs)*  *Members > 19 years require PA for FDA  labeled diagnosis or approved compendia  diagnosis.

## 2025-02-23 DIAGNOSIS — F90.2 ATTENTION DEFICIT HYPERACTIVITY DISORDER (ADHD), COMBINED TYPE: Primary | ICD-10-CM

## 2025-02-23 RX ORDER — DEXTROAMPHETAMINE SACCHARATE, AMPHETAMINE ASPARTATE MONOHYDRATE, DEXTROAMPHETAMINE SULFATE AND AMPHETAMINE SULFATE 5; 5; 5; 5 MG/1; MG/1; MG/1; MG/1
20 CAPSULE, EXTENDED RELEASE ORAL EVERY MORNING
Qty: 30 CAPSULE | Refills: 0 | Status: SHIPPED | OUTPATIENT
Start: 2025-02-23 | End: 2025-03-25

## 2025-02-23 RX ORDER — DEXTROAMPHETAMINE SACCHARATE, AMPHETAMINE ASPARTATE MONOHYDRATE, DEXTROAMPHETAMINE SULFATE AND AMPHETAMINE SULFATE 5; 5; 5; 5 MG/1; MG/1; MG/1; MG/1
20 CAPSULE, EXTENDED RELEASE ORAL EVERY MORNING
Qty: 30 CAPSULE | Refills: 0 | Status: SHIPPED | OUTPATIENT
Start: 2025-03-25 | End: 2025-04-24

## 2025-02-23 RX ORDER — DEXTROAMPHETAMINE SACCHARATE, AMPHETAMINE ASPARTATE MONOHYDRATE, DEXTROAMPHETAMINE SULFATE AND AMPHETAMINE SULFATE 5; 5; 5; 5 MG/1; MG/1; MG/1; MG/1
20 CAPSULE, EXTENDED RELEASE ORAL EVERY MORNING
Qty: 30 CAPSULE | Refills: 0 | Status: SHIPPED | OUTPATIENT
Start: 2025-04-24 | End: 2025-05-24

## 2025-02-24 ENCOUNTER — PATIENT MESSAGE (OUTPATIENT)
Dept: NEUROLOGY | Facility: CLINIC | Age: 45
End: 2025-02-24
Payer: COMMERCIAL

## 2025-05-07 ENCOUNTER — APPOINTMENT (OUTPATIENT)
Dept: NEUROLOGY | Facility: CLINIC | Age: 45
End: 2025-05-07
Payer: COMMERCIAL

## 2025-05-07 VITALS
WEIGHT: 132.6 LBS | BODY MASS INDEX: 23.5 KG/M2 | HEIGHT: 63 IN | HEART RATE: 85 BPM | SYSTOLIC BLOOD PRESSURE: 109 MMHG | DIASTOLIC BLOOD PRESSURE: 76 MMHG

## 2025-05-07 DIAGNOSIS — F90.2 ATTENTION DEFICIT HYPERACTIVITY DISORDER (ADHD), COMBINED TYPE: ICD-10-CM

## 2025-05-07 DIAGNOSIS — G93.40 ENCEPHALOPATHY, UNSPECIFIED TYPE: Primary | ICD-10-CM

## 2025-05-07 DIAGNOSIS — Z79.899 MEDICATION MANAGEMENT: ICD-10-CM

## 2025-05-07 PROCEDURE — 3008F BODY MASS INDEX DOCD: CPT

## 2025-05-07 PROCEDURE — 99214 OFFICE O/P EST MOD 30 MIN: CPT

## 2025-05-07 RX ORDER — DEXTROAMPHETAMINE SACCHARATE, AMPHETAMINE ASPARTATE MONOHYDRATE, DEXTROAMPHETAMINE SULFATE AND AMPHETAMINE SULFATE 5; 5; 5; 5 MG/1; MG/1; MG/1; MG/1
20 CAPSULE, EXTENDED RELEASE ORAL EVERY MORNING
Qty: 30 CAPSULE | Refills: 0 | Status: SHIPPED | OUTPATIENT
Start: 2025-05-07 | End: 2025-06-06

## 2025-05-07 RX ORDER — DEXTROAMPHETAMINE SACCHARATE, AMPHETAMINE ASPARTATE, DEXTROAMPHETAMINE SULFATE AND AMPHETAMINE SULFATE 2.5; 2.5; 2.5; 2.5 MG/1; MG/1; MG/1; MG/1
10 TABLET ORAL DAILY
Qty: 30 TABLET | Refills: 0 | Status: SHIPPED | OUTPATIENT
Start: 2025-06-06 | End: 2025-07-06

## 2025-05-07 RX ORDER — DEXTROAMPHETAMINE SACCHARATE, AMPHETAMINE ASPARTATE MONOHYDRATE, DEXTROAMPHETAMINE SULFATE AND AMPHETAMINE SULFATE 5; 5; 5; 5 MG/1; MG/1; MG/1; MG/1
20 CAPSULE, EXTENDED RELEASE ORAL EVERY MORNING
Qty: 30 CAPSULE | Refills: 0 | Status: SHIPPED | OUTPATIENT
Start: 2025-06-06 | End: 2025-07-06

## 2025-05-07 RX ORDER — DEXTROAMPHETAMINE SACCHARATE, AMPHETAMINE ASPARTATE, DEXTROAMPHETAMINE SULFATE AND AMPHETAMINE SULFATE 2.5; 2.5; 2.5; 2.5 MG/1; MG/1; MG/1; MG/1
10 TABLET ORAL DAILY
Qty: 30 TABLET | Refills: 0 | Status: SHIPPED | OUTPATIENT
Start: 2025-07-06 | End: 2025-08-05

## 2025-05-07 RX ORDER — DEXTROAMPHETAMINE SACCHARATE, AMPHETAMINE ASPARTATE, DEXTROAMPHETAMINE SULFATE AND AMPHETAMINE SULFATE 2.5; 2.5; 2.5; 2.5 MG/1; MG/1; MG/1; MG/1
10 TABLET ORAL DAILY
Qty: 30 TABLET | Refills: 0 | Status: SHIPPED | OUTPATIENT
Start: 2025-05-07 | End: 2025-06-06

## 2025-05-07 RX ORDER — DEXTROAMPHETAMINE SACCHARATE, AMPHETAMINE ASPARTATE MONOHYDRATE, DEXTROAMPHETAMINE SULFATE AND AMPHETAMINE SULFATE 5; 5; 5; 5 MG/1; MG/1; MG/1; MG/1
20 CAPSULE, EXTENDED RELEASE ORAL EVERY MORNING
Qty: 30 CAPSULE | Refills: 0 | Status: SHIPPED | OUTPATIENT
Start: 2025-07-06 | End: 2025-08-05

## 2025-05-07 NOTE — PROGRESS NOTES
Subjective   Leila Moreno is a 44 y.o.   female.  HPI  The patient is being seen today for their encephalopathy r/t their ADHD. They are currently taking long acting Adderall and it has NOT been effective. Unfortunately, her insurance will not cover the short acting TID. The patient can tell when the medication wears off, usually around 2pm. The patient agrees that their quality of life has improved while taking this medication. Patient denies any chest pain, heart palpitations, sleep issues, appetite changes, weight loss, and mood changes while taking this medication. I have reviewed the medications and the chart. Review of systems are negative unless otherwise specified in HPI.    I have personally reviewed the OARRS report for this patient. I have considered the risks of abuse, dependence, addiction, and diversion. I believe that it is critically appropriate for this patient to be prescribed this medication based on documented diagnosis of ADHD. An updated contract between the patient and myself was signed, scanned into the EMR, and the patient agrees to the terms. Any deviation from this agreement will result in termination from my practice. CSA was signed and dated. UDS obtained, results are pending.   Objective   Neurological Exam  Mental Status  Awake, alert and oriented to person, place and time. Speech is normal. Language is fluent with no aphasia. Attention and concentration are normal.    Cranial Nerves  CN III, IV, VI: Pupils equal round and reactive to light bilaterally.  And EOM's Normal.    Motor   Strength is 5/5 throughout all four extremities.    Sensory  Light touch is normal in upper and lower extremities.     Reflexes  Deep tendon reflexes are 2+ and symmetric in all four extremities.    Gait  Casual gait is normal including stance, stride, and arm swing.Normal toe walking. Normal heel walking.    Physical Exam  Eyes:      Pupils: Pupils are equal, round, and reactive to light.    Neurological:      Motor: Motor strength is normal.     Deep Tendon Reflexes: Reflexes are normal and symmetric.   Psychiatric:         Speech: Speech normal.           Assessment/Plan   #Encephalopathy in the setting of ADHD    Discussed following up in 3 months. Medication was sent to pharmacy. Discussed with the patient the purpose of medication, as well as potential side effects to be aware of. Informed the patient about abuse potential of medication and the importance adhering to the controlled substance agreement. Advised patient to call office with any adverse reaction to medication.

## 2025-05-10 LAB
AMPHET UR-MCNC: 5537 NG/ML
AMPHETAMINES UR QL: POSITIVE NG/ML
BARBITURATES UR QL: NEGATIVE NG/ML
BENZODIAZ UR QL: NEGATIVE NG/ML
BZE UR QL: NEGATIVE NG/ML
CREAT UR-MCNC: 17.6 MG/DL
DRUG SCREEN COMMENT UR-IMP: ABNORMAL
FENTANYL UR QL SCN: NEGATIVE NG/ML
METHADONE UR QL: NEGATIVE NG/ML
METHAMPHET UR-MCNC: NEGATIVE NG/ML
OPIATES UR QL: NEGATIVE NG/ML
OXIDANTS UR QL: NEGATIVE MCG/ML
OXYCODONE UR QL: NEGATIVE NG/ML
PCP UR QL: NEGATIVE NG/ML
PH UR: 5.4 [PH] (ref 4.5–9)
QUEST NOTES AND COMMENTS: ABNORMAL
SP GR UR: 1
THC UR QL: NEGATIVE NG/ML

## 2025-07-29 ENCOUNTER — OFFICE VISIT (OUTPATIENT)
Dept: ORTHOPEDIC SURGERY | Facility: CLINIC | Age: 45
End: 2025-07-29
Payer: COMMERCIAL

## 2025-07-29 DIAGNOSIS — G56.00 CARPAL TUNNEL SYNDROME, UNSPECIFIED LATERALITY: ICD-10-CM

## 2025-07-29 DIAGNOSIS — G56.02 CARPAL TUNNEL SYNDROME OF LEFT WRIST: Primary | ICD-10-CM

## 2025-07-29 DIAGNOSIS — G56.00 CARPAL TUNNEL SYNDROME, UNSPECIFIED LATERALITY: Primary | ICD-10-CM

## 2025-07-29 PROCEDURE — 99212 OFFICE O/P EST SF 10 MIN: CPT | Mod: 25 | Performed by: ORTHOPAEDIC SURGERY

## 2025-07-29 PROCEDURE — 20526 THER INJECTION CARP TUNNEL: CPT | Mod: RT | Performed by: ORTHOPAEDIC SURGERY

## 2025-07-29 PROCEDURE — 99214 OFFICE O/P EST MOD 30 MIN: CPT | Performed by: ORTHOPAEDIC SURGERY

## 2025-07-29 PROCEDURE — 20526 THER INJECTION CARP TUNNEL: CPT | Mod: LT | Performed by: ORTHOPAEDIC SURGERY

## 2025-07-29 PROCEDURE — 2500000004 HC RX 250 GENERAL PHARMACY W/ HCPCS (ALT 636 FOR OP/ED): Performed by: ORTHOPAEDIC SURGERY

## 2025-07-29 RX ORDER — LIDOCAINE HYDROCHLORIDE 10 MG/ML
1 INJECTION, SOLUTION INFILTRATION; PERINEURAL
Status: COMPLETED | OUTPATIENT
Start: 2025-07-29 | End: 2025-07-29

## 2025-07-29 RX ADMIN — TRIAMCINOLONE ACETONIDE 10 MG: 10 INJECTION, SUSPENSION INTRA-ARTICULAR; INTRALESIONAL at 10:12

## 2025-07-29 RX ADMIN — TRIAMCINOLONE ACETONIDE 1 ML: 10 INJECTION, SUSPENSION INTRA-ARTICULAR; INTRALESIONAL at 10:13

## 2025-07-29 RX ADMIN — LIDOCAINE HYDROCHLORIDE 1 ML: 10 INJECTION, SOLUTION INFILTRATION; PERINEURAL at 10:13

## 2025-07-29 RX ADMIN — LIDOCAINE HYDROCHLORIDE 1 ML: 10 INJECTION, SOLUTION INFILTRATION; PERINEURAL at 10:12

## 2025-07-29 NOTE — PROGRESS NOTES
History present illness: Patient presents today for evaluation of complex left upper extremity pain numbness and tingling.  She describes numbness through median and ulnar nerve distribution to the bilateral upper extremities.  EMG shows evidence for mild bilateral carpal tunnel syndrome and a quite mild left Guyon's compression.      Past medical history: The patient's past medical history, family history, social history, and review of systems were documented on the patient medical intake.  The updated data was reviewed in the electronic medical record.  History is negative except otherwise stated in history of present illness.        Physical examination:  General: Alert and oriented to person, place, and time.  No acute distress and breathing comfortably: Pleasant and cooperative with examination.  HEENT: Head is normocephalic and atraumatic.  Neck: Supple, no visible swelling.  Cardiovascular: No palpable tachycardia  Lungs: No audible wheezing or labored breathing  Abdomen: Nondistended.  Extremities: Evaluation of the bilateral upper extremities finds the patient had palpable radial artery at the wrists with brisk capillary refill to all digits.  Patient has intact sensation to axillary radial median and ulnar nerves.  There are no open wounds.  There are no signs of infection.  There is no evidence of lymphedema or lymphatic streaking.  The patient has supple compartments to bilateral arm forearm and hand.  Positive Tinel's over course of ulnar nerve to bilateral elbow median nerve to bilateral wrist and ulnar nerve to left wrist.  Positive drawer compression test and Phalen's maneuver bilaterally.      Radiology:      Assessment: Complex bilateral extremity pain numbness and tingling with likely components of bilateral carpal tunnel syndrome bilateral cubital tunnel syndrome and left Guyon's.      Plan: Treatment options were discussed.  We talked about operative and nonoperative strategies.  Patient elects  for bilateral carpal tunnel injection given that she cannot stop working at the moment.  She understands surgery may be necessary over time.  We talked about intraoperative techniques and postoperative protocols.  See back in 3 months.  No x-rays upon return.        Procedure:  Hand / UE Inj/Asp: L carpal tunnel for carpal tunnel syndrome on 7/29/2025 10:12 AM  Indications: diagnostic and therapeutic  Details: 25 G needle, volar approach  Medications: 10 mg triamcinolone acetonide 10 mg/mL; 1 mL lidocaine 10 mg/mL (1 %)  Outcome: tolerated well, no immediate complications    Left Carpal Tunnel Injection: It was explained to the patient that the risks of a steroid injection include but are not limited to infection, local skin irritation, skin atrophy, calcification, continued pain and discomfort, elevated blood sugar, burning, failure to relieve pain, and possible late infection. The patient verbalized good insight and verbalized consent for the injection. It was further explained that the post-injection discomfort can be alleviated with additional medications, ice, elevation, and rest over the first 24 hours, and that these modalities are recommended.  After informed consent was provided, patient identification was confirmed, and allergies were verified, the patient was appropriately positioned. The site was marked and time-out performed.    Using aseptic technique, a solution containing 1 mL of 10 mg of Kenalog and 1 mL of 1% lidocaine without epinephrine was injected into the patient's left carpal tunnel. A 25-gauge needle was inserted just ulnar to the anatomic course of the palmaris longus tendon at the level of the distal wrist flexion crease. It was slowly advanced deep to the distal antebrachial fascia. The solution was then injected slowly, taking care to ensure that the patient experienced no paresthesias during the injection of the solution. After injection of the solution, the patient was asked to perform  active flexion and extension of the digits and wrist to help disperse the solution. A band-aid was then placed at the injection site. The patient tolerated this left carpal tunnel injection well.      Procedure, treatment alternatives, risks and benefits explained, specific risks discussed. Consent was given by the patient. Immediately prior to procedure a time out was called to verify the correct patient, procedure, equipment, support staff and site/side marked as required. Patient was prepped and draped in the usual sterile fashion.       Hand / UE Inj/Asp: R carpal tunnel for carpal tunnel syndrome on 7/29/2025 10:13 AM  Indications: pain and tendon swelling  Details: 25 G needle, volar approach  Medications: 1 mL triamcinolone acetonide 10 mg/mL; 1 mL lidocaine 10 mg/mL (1 %)  Outcome: tolerated well, no immediate complications  Procedure, treatment alternatives, risks and benefits explained, specific risks discussed. Consent was given by the patient. Patient was prepped and draped in the usual sterile fashion.

## 2025-07-30 ENCOUNTER — APPOINTMENT (OUTPATIENT)
Dept: ORTHOPEDIC SURGERY | Facility: CLINIC | Age: 45
End: 2025-07-30
Payer: COMMERCIAL

## 2025-07-30 ENCOUNTER — APPOINTMENT (OUTPATIENT)
Dept: NEUROLOGY | Facility: CLINIC | Age: 45
End: 2025-07-30
Payer: COMMERCIAL

## 2025-07-30 VITALS
HEART RATE: 95 BPM | DIASTOLIC BLOOD PRESSURE: 71 MMHG | SYSTOLIC BLOOD PRESSURE: 102 MMHG | BODY MASS INDEX: 23.32 KG/M2 | WEIGHT: 131.6 LBS | HEIGHT: 63 IN

## 2025-07-30 DIAGNOSIS — G93.40 ENCEPHALOPATHY, UNSPECIFIED TYPE: Primary | ICD-10-CM

## 2025-07-30 DIAGNOSIS — F90.2 ATTENTION DEFICIT HYPERACTIVITY DISORDER (ADHD), COMBINED TYPE: ICD-10-CM

## 2025-07-30 PROCEDURE — 3008F BODY MASS INDEX DOCD: CPT

## 2025-07-30 PROCEDURE — L3908 WHO COCK-UP NONMOLDE PRE OTS: HCPCS | Performed by: ORTHOPAEDIC SURGERY

## 2025-07-30 PROCEDURE — 99214 OFFICE O/P EST MOD 30 MIN: CPT

## 2025-07-30 RX ORDER — DEXTROAMPHETAMINE SACCHARATE, AMPHETAMINE ASPARTATE, DEXTROAMPHETAMINE SULFATE AND AMPHETAMINE SULFATE 2.5; 2.5; 2.5; 2.5 MG/1; MG/1; MG/1; MG/1
10 TABLET ORAL DAILY
Qty: 30 TABLET | Refills: 0 | Status: SHIPPED | OUTPATIENT
Start: 2025-07-30 | End: 2025-08-29

## 2025-07-30 RX ORDER — DEXTROAMPHETAMINE SACCHARATE, AMPHETAMINE ASPARTATE MONOHYDRATE, DEXTROAMPHETAMINE SULFATE AND AMPHETAMINE SULFATE 7.5; 7.5; 7.5; 7.5 MG/1; MG/1; MG/1; MG/1
30 CAPSULE, EXTENDED RELEASE ORAL EVERY MORNING
Qty: 30 CAPSULE | Refills: 0 | Status: SHIPPED | OUTPATIENT
Start: 2025-07-30 | End: 2025-08-29

## 2025-07-30 RX ORDER — DEXTROAMPHETAMINE SACCHARATE, AMPHETAMINE ASPARTATE MONOHYDRATE, DEXTROAMPHETAMINE SULFATE AND AMPHETAMINE SULFATE 7.5; 7.5; 7.5; 7.5 MG/1; MG/1; MG/1; MG/1
30 CAPSULE, EXTENDED RELEASE ORAL EVERY MORNING
Qty: 30 CAPSULE | Refills: 0 | Status: SHIPPED | OUTPATIENT
Start: 2025-08-29 | End: 2025-09-28

## 2025-07-30 RX ORDER — DEXTROAMPHETAMINE SACCHARATE, AMPHETAMINE ASPARTATE, DEXTROAMPHETAMINE SULFATE AND AMPHETAMINE SULFATE 2.5; 2.5; 2.5; 2.5 MG/1; MG/1; MG/1; MG/1
10 TABLET ORAL DAILY
Qty: 30 TABLET | Refills: 0 | Status: SHIPPED | OUTPATIENT
Start: 2025-09-29 | End: 2025-10-29

## 2025-07-30 RX ORDER — DEXTROAMPHETAMINE SACCHARATE, AMPHETAMINE ASPARTATE, DEXTROAMPHETAMINE SULFATE AND AMPHETAMINE SULFATE 2.5; 2.5; 2.5; 2.5 MG/1; MG/1; MG/1; MG/1
10 TABLET ORAL DAILY
Qty: 30 TABLET | Refills: 0 | Status: SHIPPED | OUTPATIENT
Start: 2025-08-29 | End: 2025-09-28

## 2025-07-30 RX ORDER — DEXTROAMPHETAMINE SACCHARATE, AMPHETAMINE ASPARTATE MONOHYDRATE, DEXTROAMPHETAMINE SULFATE AND AMPHETAMINE SULFATE 7.5; 7.5; 7.5; 7.5 MG/1; MG/1; MG/1; MG/1
30 CAPSULE, EXTENDED RELEASE ORAL EVERY MORNING
Qty: 30 CAPSULE | Refills: 0 | Status: SHIPPED | OUTPATIENT
Start: 2025-09-28 | End: 2025-10-28

## 2025-07-30 NOTE — PROGRESS NOTES
Subjective   Leila Moreno is a 45 y.o.   female.  HPI  The patient is being seen today for their encephalopathy r/t their ADHD. They are currently taking Adderall XR 20mg and short acting 10mg in afternoon and it has NOT been effective. The patient can tell when the medication wears off, which is way too soon. The patient agrees that their quality of life has improved while taking this medication. Patient denies any chest pain, heart palpitations, sleep issues, appetite changes, weight loss, and mood changes while taking this medication. I have reviewed the medications and the chart. Review of systems are negative unless otherwise specified in HPI.    Objective   Neurological Exam  Mental Status  Awake, alert and oriented to person, place and time. Speech is normal. Language is fluent with no aphasia. Attention and concentration are normal.    Cranial Nerves  CN III, IV, VI: Pupils equal round and reactive to light bilaterally.  And EOM's Normal.    Motor   Strength is 5/5 throughout all four extremities.    Sensory  Light touch is normal in upper and lower extremities.     Reflexes  Deep tendon reflexes are 2+ and symmetric in all four extremities.    Gait  Casual gait is normal including stance, stride, and arm swing.    Physical Exam    Eyes:      Pupils: Pupils are equal, round, and reactive to light.       Neurological:      Motor: Motor strength is normal.     Deep Tendon Reflexes: Reflexes are normal and symmetric.     Psychiatric:         Speech: Speech normal.           Assessment/Plan   #Encephalopathy in the setting of ADHD  Will try her on Adderall XR 30mg daily ans see how she does. Continue with IR dose in afternoon.   Discussed following up in 3 months. Medication was sent to pharmacy. Discussed with the patient the purpose of medication, as well as potential side effects to be aware of. Informed the patient about abuse potential of medication and the importance adhering to the controlled  substance agreement. Advised patient to call office with any adverse reaction to medication.

## 2025-08-05 DIAGNOSIS — Z12.31 ENCOUNTER FOR SCREENING MAMMOGRAM FOR BREAST CANCER: ICD-10-CM

## 2025-10-29 ENCOUNTER — APPOINTMENT (OUTPATIENT)
Dept: NEUROLOGY | Facility: CLINIC | Age: 45
End: 2025-10-29
Payer: COMMERCIAL